# Patient Record
Sex: MALE | Race: ASIAN | URBAN - METROPOLITAN AREA
[De-identification: names, ages, dates, MRNs, and addresses within clinical notes are randomized per-mention and may not be internally consistent; named-entity substitution may affect disease eponyms.]

---

## 2024-04-13 ENCOUNTER — EMERGENCY (EMERGENCY)
Facility: HOSPITAL | Age: 39
LOS: 1 days | Discharge: HOME | End: 2024-04-13
Attending: EMERGENCY MEDICINE | Admitting: EMERGENCY MEDICINE
Payer: OTHER MISCELLANEOUS

## 2024-04-13 VITALS
SYSTOLIC BLOOD PRESSURE: 138 MMHG | DIASTOLIC BLOOD PRESSURE: 85 MMHG | OXYGEN SATURATION: 98 % | TEMPERATURE: 98 F | RESPIRATION RATE: 16 BRPM | HEIGHT: 68 IN | HEART RATE: 88 BPM | WEIGHT: 184.97 LBS

## 2024-04-13 PROCEDURE — 99283 EMERGENCY DEPT VISIT LOW MDM: CPT
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
## 2024-04-13 NOTE — ED PROVIDER NOTE - NSFOLLOWUPINSTRUCTIONS_ED_ALL_ED_FT
Chemical Conjunctivitis, Adult    Chemical conjunctivitis, or chemical pink eye, is inflammation of the conjunctiva caused by a chemical irritant. The conjunctiva is the clear covering of the white part of the eye and the inner eyelid. The condition makes the eye red, pink, and itchy.    This condition can occur in one eye or both eyes. It cannot spread from person to person (is not contagious).    Certain chemicals, especially strong acids or bases, can cause severe damage to the eye, occasionally leading to blindness in the eye.    What are the causes?  This condition is caused by a chemical or other irritant getting in your eye, such as:  Smoke.  Chlorine.  Soap.  Fumes.  Air pollution.  What increases the risk?  The following factors may make you more likely to develop this condition:  Living in a place with high levels of air pollution.  Working in an environment with chemicals in the air.  Using swimming pools often.  Wearing contact lenses.  What are the signs or symptoms?  Symptoms of this condition include:  Eye redness.  Itchy eyes.  Burning feeling in the eyes.  Eye pain.  Clear drainage from the eyes.  Swollen eyelids.  Sensitivity to light.  How is this diagnosed?  This condition is diagnosed based on your symptoms, your medical history, and a physical exam. During the exam, your health care provider may use a medical instrument that uses magnified light (slit lamp) to examine your eyes. If you have drainage in your eyes, it may be tested to rule out other causes.    How is this treated?    Treatment for this condition involves carefully rinsing (flushing) the chemical out of your eye. The sooner this is done, the better. Treatment may also include:  Artificial tears to help wash out any remaining chemical.  Steroid eye drops to ease inflammation.  Antibiotic medicine to prevent infection.  Applying cool, wet cloths (cool compresses) to ease discomfort and inflammation.  Over-the-counter pain medicines to ease discomfort.  Follow these instructions at home:  Medicines    Take or apply over-the-counter and prescription medicines only as told by your health care provider.  If you were prescribed an antibiotic medicine, take or apply it as told by your health care provider. Do not stop using the antibiotic even if you start to feel better.  Use eye drops and ointments as told by your health care provider.  General instructions    Until the inflammation is gone or for as long as directed by your health care provider:  Do not wear contact lenses. Wear glasses instead.  Do not wear eye makeup.  Do not touch or rub your eyes.  Apply a clean cool compress to your eye for 10–20 minutes, 3–4 times a day as needed for comfort.  Avoid being around the chemical or environment that caused the irritation. Wear eye protection as necessary.  Wash your hands with soap and water for at least 20 seconds before you apply eye drops or cool compresses to your eyes. If soap and water are not available, use hand .  Keep all follow-up visits. This is important.  Contact a health care provider if:  Your symptoms do not improve or they get worse.  You have new symptoms.  Your pain gets worse.  You have pus draining from your eye.  Get help right away if:  Your vision suddenly gets worse.  Summary  Chemical conjunctivitis, or chemical pink eye, is inflammation of the conjunctiva caused by a chemical irritant. The conjunctiva is the clear covering of the white part of the eye and the inner eyelid.  This condition is diagnosed based on your symptoms, your medical history, and a physical exam.  Initial treatment for this condition involves carefully rinsing (flushing) the chemical out of your eye. You may be prescribed eye drops.  Until the inflammation is gone, do not wear contact lenses or eye makeup.  This information is not intended to replace advice given to you by your health care provider. Make sure you discuss any questions you have with your health care provider.

## 2024-04-13 NOTE — ED PROVIDER NOTE - PHYSICAL EXAMINATION
Constitutional: awake and alert, in no acute distress  HEENT: head normocephalic and atraumatic. moist mucous membranes  Eyes: extraocular movements intact, Mild bilateral conjunctival injection.  OD 20/20, OS 20/20.  Neck: supple, normal ROM  Cardiovascular: regular rate   Pulmonary: no respiratory distress  Gastrointestinal: abdomen flat and nondistended  Skin: warm, dry, normal for ethnicity  Musculoskeletal: no edema, no deformity  Neurological: oriented x4, no focal neurologic deficit.   Psychiatric: calm and cooperative

## 2024-04-13 NOTE — ED PROVIDER NOTE - ED STEMI HIDDEN
hide Airway patent, Nasal mucosa clear. Mouth with normal mucosa. Throat has no vesicles, no oropharyngeal exudates and uvula is midline.

## 2024-04-13 NOTE — ED PROVIDER NOTE - PATIENT PORTAL LINK FT
You can access the FollowMyHealth Patient Portal offered by [facility] by registering at the following website: [email or web address]. By joining [email or web address]’s FollowMyHealth portal, you will also be able to view your health information using other applications (apps) compatible with our system.
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
## 2024-04-15 DIAGNOSIS — H10.213 ACUTE TOXIC CONJUNCTIVITIS, BILATERAL: ICD-10-CM

## 2024-04-15 DIAGNOSIS — H57.13 OCULAR PAIN, BILATERAL: ICD-10-CM

## 2024-05-06 ENCOUNTER — APPOINTMENT (OUTPATIENT)
Age: 39
End: 2024-05-06

## 2024-05-06 DIAGNOSIS — Z13.6 ENCOUNTER FOR SCREENING FOR CARDIOVASCULAR DISORDERS: ICD-10-CM

## 2024-05-06 PROBLEM — Z00.00 ENCOUNTER FOR PREVENTIVE HEALTH EXAMINATION: Status: ACTIVE | Noted: 2024-05-06

## 2024-06-13 NOTE — ED ADULT TRIAGE NOTE - BSA (M2)
Fasting labs today  Immunizations are up to date  Schedule lung cancer screening after 7/8/24  Colon due in 2032 - Dr Melgoza  F/U in 1 yr  -  Dr Brain Enrique  
1.98

## 2024-06-16 ENCOUNTER — RESULT REVIEW (OUTPATIENT)
Age: 39
End: 2024-06-16

## 2024-06-16 ENCOUNTER — OUTPATIENT (OUTPATIENT)
Dept: OUTPATIENT SERVICES | Facility: HOSPITAL | Age: 39
LOS: 1 days | End: 2024-06-16
Payer: COMMERCIAL

## 2024-06-16 DIAGNOSIS — R07.9 CHEST PAIN, UNSPECIFIED: ICD-10-CM

## 2024-06-16 DIAGNOSIS — Z00.8 ENCOUNTER FOR OTHER GENERAL EXAMINATION: ICD-10-CM

## 2024-06-16 PROCEDURE — 71250 CT THORAX DX C-: CPT

## 2024-06-16 PROCEDURE — 71250 CT THORAX DX C-: CPT | Mod: 26,NC

## 2024-06-16 PROCEDURE — 75571 CT HRT W/O DYE W/CA TEST: CPT | Mod: 26,NC

## 2024-06-16 PROCEDURE — 75571 CT HRT W/O DYE W/CA TEST: CPT

## 2024-06-17 DIAGNOSIS — R07.9 CHEST PAIN, UNSPECIFIED: ICD-10-CM
